# Patient Record
Sex: MALE | Race: WHITE
[De-identification: names, ages, dates, MRNs, and addresses within clinical notes are randomized per-mention and may not be internally consistent; named-entity substitution may affect disease eponyms.]

---

## 2018-05-17 ENCOUNTER — HOSPITAL ENCOUNTER (EMERGENCY)
Dept: HOSPITAL 62 - ER | Age: 23
Discharge: HOME | End: 2018-05-17
Payer: SELF-PAY

## 2018-05-17 VITALS — SYSTOLIC BLOOD PRESSURE: 112 MMHG | DIASTOLIC BLOOD PRESSURE: 73 MMHG

## 2018-05-17 VITALS — SYSTOLIC BLOOD PRESSURE: 122 MMHG | DIASTOLIC BLOOD PRESSURE: 78 MMHG

## 2018-05-17 DIAGNOSIS — E05.90: ICD-10-CM

## 2018-05-17 DIAGNOSIS — Z79.899: ICD-10-CM

## 2018-05-17 DIAGNOSIS — R00.2: Primary | ICD-10-CM

## 2018-05-17 DIAGNOSIS — R00.2: ICD-10-CM

## 2018-05-17 DIAGNOSIS — R07.9: ICD-10-CM

## 2018-05-17 DIAGNOSIS — R53.1: ICD-10-CM

## 2018-05-17 DIAGNOSIS — J45.909: Primary | ICD-10-CM

## 2018-05-17 DIAGNOSIS — J45.909: ICD-10-CM

## 2018-05-17 DIAGNOSIS — I45.10: ICD-10-CM

## 2018-05-17 DIAGNOSIS — R06.02: ICD-10-CM

## 2018-05-17 DIAGNOSIS — R07.89: ICD-10-CM

## 2018-05-17 DIAGNOSIS — R00.1: ICD-10-CM

## 2018-05-17 LAB
FREE T4 (FREE THYROXINE): 1.1 NG/DL (ref 0.78–2.19)
T3FREE SERPL-MCNC: 5.9 PG/ML (ref 2.77–5.27)
TSH SERPL-ACNC: 7.55 UIU/ML (ref 0.47–4.68)

## 2018-05-17 PROCEDURE — 93005 ELECTROCARDIOGRAM TRACING: CPT

## 2018-05-17 PROCEDURE — 84443 ASSAY THYROID STIM HORMONE: CPT

## 2018-05-17 PROCEDURE — 36415 COLL VENOUS BLD VENIPUNCTURE: CPT

## 2018-05-17 PROCEDURE — 99285 EMERGENCY DEPT VISIT HI MDM: CPT

## 2018-05-17 PROCEDURE — 99284 EMERGENCY DEPT VISIT MOD MDM: CPT

## 2018-05-17 PROCEDURE — 93010 ELECTROCARDIOGRAM REPORT: CPT

## 2018-05-17 PROCEDURE — 71045 X-RAY EXAM CHEST 1 VIEW: CPT

## 2018-05-17 PROCEDURE — 71046 X-RAY EXAM CHEST 2 VIEWS: CPT

## 2018-05-17 PROCEDURE — 84439 ASSAY OF FREE THYROXINE: CPT

## 2018-05-17 PROCEDURE — 84481 FREE ASSAY (FT-3): CPT

## 2018-05-17 NOTE — ER DOCUMENT REPORT
ED General





- General


Chief Complaint: Palpitations


Stated Complaint: CHEST PAIN, PALPITATIONS


Time Seen by Provider: 05/17/18 21:52


Notes: 


Patient is a 22-year-old male who presents with complaint of feeling 

palpitations.  When asked him to explain palpitations he says he just feels as 

if his heart beats hard.  He says it does not initially be fast but it beats 

heart sometimes hurts when the beats.  I saw him yesterday for similar 

symptoms.  Patient has a history of taking hormone supplementation.  He was 

seen a month ago after this he was told to stop doing that and also his thyroid 

function was off and therefore he was referred to primary care doctor placed on 

atenolol.  Patient's heart rate last night was in the 40s most the time he was 

on a monitor and therefore I told him he has a state taking the atenolol.  

Patient says he feels that his palpitations have increased even though his 

heart rate is completely normal here.  When talking to mom watch and monitor 

and I do not see any evidence in his heart rate remains in the 50s and 60s a 

whole time in the room.





TRAVEL OUTSIDE OF THE [place] IN LAST 30 DAYS: No





- Related Data


Allergies/Adverse Reactions: 


 





No Known Allergies Allergy (Verified 01/02/18 14:12)


 











Past Medical History





- Social History


Smoking Status: Never Smoker


Frequency of alcohol use: None


Drug Abuse: None


Family History: Reviewed & Not Pertinent


Pulmonary Medical History: Reports: Hx Asthma


Renal/ Medical History: Denies: Hx Peritoneal Dialysis





- Immunizations


Immunizations up to date: Yes


Hx Diphtheria, Pertussis, Tetanus Vaccination: Yes





Review of Systems





- Review of Systems


Notes: 





My Normal Review Basic





REVIEW OF SYSTEMS:


CONSTITUTIONAL :  Denies fever,  chills, or sweats.  Denies recent illness.


EENT:   Denies eye, ear, throat, or mouth pain or symptoms.  Denies nasal or 

sinus congestion.


CARDIOVASCULAR: Occasional pain in chest when he feels a "hard heartbeat".


RESPIRATORY:  Denies cough, cold, or chest congestion.  Denies shortness of 

breath, difficulty breathing, or wheezing.


GASTROINTESTINAL:  Denies abdominal pain.  Denies nausea, vomiting, or 

diarrhea.  Denies constipation.  Last BM: 


GENITOURINARY:  Denies difficulty urinating, painful urination, burning, 

frequency, or blood in urine.


MUSCULOSKELETAL:  Denies neck or back pain or joint pain or swelling.


SKIN:   Denies rash or skin lesions.


NEUROLOGICAL:  Denies altered mental status or loss of consciousness.  Denies 

headache.  Denies weakness or paralysis or loss of use of either side.  Denies 

problems with gait or speech.  Denies sensory or motor loss.


ALL OTHER SYSTEMS REVIEWED AND NEGATIVE.





Physical Exam





- Vital signs


Vitals: 


 











Temp Pulse Resp BP Pulse Ox


 


 97.6 F   61   18   136/72 H  100 


 


 05/17/18 21:44  05/17/18 21:44  05/17/18 21:44  05/17/18 21:44  05/17/18 21:44














- Notes


Notes: 





General Appearance: Well nourished, alert, cooperative, no acute distress, no 

obvious discomfort.  Well-appearing.


Vitals: reviewed, See vital signs table.


Head: no swelling or tenderness to the head


Eyes: PERRL, EOMI, Conjuctiva clear


Mouth: No decreasd moistur


Neck: Supple, no neck tenderness, No thyromegaly


Lungs: No wheezing, No rales, No rhonci, No accessory muscle use, good air 

exchange bilaterally.


Heart: Normal rate, Regular rythm, No murmur, no rub


Extremities: strength 5/5 in all extremities, good pulses in all extremities, 

no swelling or tenderness in the extremities, no edema.


Skin: warm, dry, appropriate color, no rash


Neuro: speech clear, oriented x 3, normal affect, responds appropriately to 

questions.





Course





- Re-evaluation


Re-evalutation: 





05/18/18 05:59


She looks very well on exam.  I do not see evidence of PACs or PVCs while 

talked to the patient watch and monitor.  He has normal-appearing EKG except 

for right bundle branch block which is old in comparison to his previous EKGs.  

Chest x-ray is ordered in triage and continues to be normal.  I will refer him 

to cardiology for follow-up and possible Holter monitor placement.  Patient has 

no risk factors for coronary disease.  Otherwise looks well.  Patient was again 

asked about whether or not he should take an estrogen supplements to help with 

his thyroid.  I informed him that last night when he is here his thyroid 

function numbers were improving significantly and that he should therefore stay 

away from all forms of supplement or amount that are not prescribed.  Patient 

agrees to do this.  Patient return to ER if she he has severe chest pain, 

difficulty breathing, rapid heartbeat, or if he feels unwell.  Patient agrees 

with plan will be discharged home.





Dictation of this chart was performed using voice recognition software; 

therefore, there may be some unintended grammatical errors.





- Vital Signs


Vital signs: 


 











Temp Pulse Resp BP Pulse Ox


 


 97.8 F   85   18   122/78   100 


 


 05/17/18 23:52  05/17/18 23:52  05/17/18 23:52  05/17/18 23:52  05/17/18 23:52














- EKG Interpretation by Me


Additional EKG results interpreted by me: 





05/17/18 23:39


EKG is reviewed and interpreted by me.  EKG shows sinus rhythm with rate 57 

bpm.  No ST segment elevation or depression.  No ischemic T-wave inversions.  

IN interval and QTc intervals are within normal range.  QRS duration is 

slightly prolonged.  Patient does have a incomplete right bundle branch block 

which is unchanged comparison to his previous EKG.





Discharge





- Discharge


Clinical Impression: 


 Heart palpitations





Condition: Good


Disposition: HOME, SELF-CARE


Additional Instructions: 


Please do not take the Atenolol as your heart rate is too ow too take it. 

Please call Dr. Taylor's office in the am and inform them that the ER is 

referring you there for evaluation and potential holter monitor due to your 

recurrent palpitations. Please do not take any form of hormone supplementation. 

Your Thyroid function was much improved last night compared to last month. 

Despite this, you still need to follow up with your doctor early next week and 

have it rechecked to make sure it is continuing to improve.


Referrals: 


TRAVIS TAYLOR MD [ACTIVE STAFF] - 05/18/18

## 2018-05-17 NOTE — RADIOLOGY REPORT (SQ)
EXAM DESCRIPTION: Single view of the chest



CLINICAL HISTORY: dyspnea



COMPARISON: 3/10/2018



FINDINGS: Single frontal view of the chest.  The

cardiomediastinal silhouette has normal size and contour. No

consolidation, pneumothorax, or pleural effusion.  No displaced

rib fractures identified.  Upper abdominal soft tissues are

unremarkable.



IMPRESSION:



1. No acute pulmonary process identified.

## 2018-05-17 NOTE — ER DOCUMENT REPORT
ED Medical Screen (RME)





- General


Chief Complaint: Palpitations


Stated Complaint: CHEST PAIN, PALPITATIONS


Time Seen by Provider: 05/17/18 21:52


TRAVEL OUTSIDE OF THE [place] IN LAST 30 DAYS: No





- HPI


Notes: 





05/17/18 21:52


Patient is a 22-year-old male with a history of anxiety and palpitations who 

presents to the ED complaining of left sternal chest pain and episodes of 

palpitations over the last couple hours.  Patient states that he used to be on 

atenolol, but has since stopped that.  Patient has been seen before in the past 

for palpitations.  He is still eating and drinking without difficulties.  He is 

urinating normally.  He does not smoke, drink, or do drugs.  He has not been 

seen by cardiologist.  Denies any headache, fever, neck pain, URI, sore throat, 

syncope, cough, shortness of breath, wheeze, dyspnea, abdominal pain, nausea/

vomiting/diarrhea, urinary retention, dysuria, hematuria, or rash.








I have treated and performed a rapid initial assessment of this patient.  A 

comprehensive ED assessment and evaluation of the patient, analysis of test 

results and completion of medical decision making process will be conducted by 

additional ED providers.








PHYSICAL EXAMINATION:





GENERAL: Well-appearing, well-nourished and in no acute distress.  A&Ox4.  

Answers questions appropriately.





Chest: + tenderness to the left sternal border, correlates with pain described.





LUNGS: Breath sounds clear to auscultation bilaterally and equal.  No wheezes 

rales or rhonchi.





HEART: Regular rate and rhythm without murmurs, rubs, gallops.





Extremities:  No cyanosis, clubbing, or edema b/l.  





NEUROLOGICAL: Normal speech, normal gait. 





PSYCH: Normal mood, normal affect.








- Related Data


Allergies/Adverse Reactions: 


 





No Known Allergies Allergy (Verified 01/02/18 14:12)


 











Past Medical History


Pulmonary Medical History: Reports: Hx Asthma


Renal/ Medical History: Denies: Hx Peritoneal Dialysis





- Immunizations


Immunizations up to date: Yes


Hx Diphtheria, Pertussis, Tetanus Vaccination: Yes





Physical Exam





- Vital signs


Vitals: 





 











Temp Pulse Resp BP Pulse Ox


 


 97.6 F   61   18   136/72 H  100 


 


 05/17/18 21:44  05/17/18 21:44  05/17/18 21:44  05/17/18 21:44  05/17/18 21:44














Course





- Vital Signs


Vital signs: 





 











Temp Pulse Resp BP Pulse Ox


 


 97.6 F   61   18   136/72 H  100 


 


 05/17/18 21:44  05/17/18 21:44  05/17/18 21:44  05/17/18 21:44  05/17/18 21:44

## 2018-05-17 NOTE — EKG REPORT
SEVERITY:- ABNORMAL ECG -

SINUS RHYTHM

IVCD, CONSIDER ATYPICAL RBBB

INFERIOR Q WAVES, PROBABLY NORMAL VARIATION

:

Confirmed by: Prabhakar Foley 17-May-2018 09:56:56

## 2018-05-17 NOTE — RADIOLOGY REPORT (SQ)
EXAM DESCRIPTION:  CHEST 2 VIEWS



COMPLETED DATE/TIME:  5/17/2018 10:30 pm



REASON FOR STUDY:  chest pain



COMPARISON:  1/24/2018



EXAM PARAMETERS:  NUMBER OF VIEWS: two views

TECHNIQUE: Digital Frontal and Lateral radiographic views of the chest acquired.

RADIATION DOSE: NA

LIMITATIONS: none



FINDINGS:  LUNGS AND PLEURA: No opacities, masses or pneumothorax. No pleural effusion.

MEDIASTINUM AND HILAR STRUCTURES: No masses or contour abnormalities.

HEART AND VASCULAR STRUCTURES: Heart normal size.  No evidence for failure.

BONES: No acute findings.

HARDWARE: None in the chest.

OTHER: No other significant finding.



IMPRESSION:  NO ACUTE RADIOGRAPHIC FINDING IN THE CHEST.



TECHNICAL DOCUMENTATION:  JOB ID:  [id number]

 2011 [employer]- All Rights Reserved



Reading location - IP/workstation name: [email or web address]

## 2018-05-17 NOTE — ER DOCUMENT REPORT
ED General





- General


Chief Complaint: Chest Pain


Stated Complaint: CHEST PAIN


Time Seen by Provider: 05/17/18 00:16


Notes: 


Patient is a 22-year-old male who presents with complaint of some chest pain 

shortness of breath.  He says he feels more as if he just cannot get air in.  

He was seen here back in March for palpitations.  At that time he was taking a 

"hormone booster" to help with working out and putting on muscle.  He was 

having palpitations and feeling well at that time.  Is also noted that his TSH 

was a little bit low and is T3 was low bit elevated.  Followed up with his 

primary care doctor was placed on Atenolol.  He says since starting Atenolol he 

has felt a little bit more week and has felt some shortness of breath.  He is 

wondering if the Atenolol dose is too high.  He is unsure with his repeat 

thyroid studies have shown.  Denies any fevers.  No infections.  Denies any 

recent leg pain or leg swelling.  No other complaints this time.  He has a 

previous history of asthma.  He does not think he has been wheezing.





TRAVEL OUTSIDE OF THE [place] IN LAST 30 DAYS: No





- Related Data


Allergies/Adverse Reactions: 


 





No Known Allergies Allergy (Verified 01/02/18 14:12)


 











Past Medical History





- Social History


Smoking Status: Never Smoker


Frequency of alcohol use: None


Drug Abuse: None


Family History: Reviewed & Not Pertinent


Pulmonary Medical History: Reports: Hx Asthma


Renal/ Medical History: Denies: Hx Peritoneal Dialysis





- Immunizations


Immunizations up to date: Yes


Hx Diphtheria, Pertussis, Tetanus Vaccination: Yes





Review of Systems





- Review of Systems


Notes: 





My Normal Review Basic





REVIEW OF SYSTEMS:


CONSTITUTIONAL :  Denies fever,  chills, or sweats.  Denies recent illness.


EENT:   Denies eye, ear, throat, or mouth pain or symptoms.  Denies nasal or 

sinus congestion.


CARDIOVASCULAR: No real chest pain.  He has more tightness in the chest.


RESPIRATORY: Feels as if he cannot get air in when taking a deep breath.


GASTROINTESTINAL:  Denies abdominal pain.  Denies nausea, vomiting, or 

diarrhea.  Denies constipation.  Last BM: 


MUSCULOSKELETAL:  Denies neck or back pain or joint pain or swelling.


SKIN:   Denies rash or skin lesions.


NEUROLOGICAL:  Denies altered mental status or loss of consciousness.  Denies 

headache.  Denies weakness or paralysis or loss of use of either side.  Denies 

problems with gait or speech.  Denies sensory or motor loss.


ALL OTHER SYSTEMS REVIEWED AND NEGATIVE.





Physical Exam





- Vital signs


Vitals: 


 











Temp Pulse Resp BP Pulse Ox


 


 98.5 F   56 L  18   134/77 H  99 


 


 05/17/18 00:13  05/17/18 00:13  05/17/18 00:13  05/17/18 00:13  05/17/18 00:13














- Notes


Notes: 





General Appearance: Well nourished, alert, cooperative, no acute distress, no 

obvious discomfort.  Well-appearing.


Vitals: reviewed, See vital signs table.


Head: no swelling or tenderness to the head


Eyes: PERRL, EOMI, Conjuctiva clear


Mouth: No decreasd moisture


Lungs: No wheezing, No rales, No rhonci, No accessory muscle use, good air 

exchange bilaterally.


Heart: Normal rate, Regular rythm, No murmur, no rub


Abdomen: Normal BS, soft, No rigidity, No abdominal tenderness, No guarding, no 

rebound, no abdominal masses, no organomegaly


Extremities: strength 5/5 in all extremities, good pulses in all extremities, 

no swelling or tenderness in the extremities, no edema.


Skin: warm, dry, appropriate color, no rash


Neuro: speech clear, oriented x 3, normal affect, responds appropriately to 

questions.





Course





- Re-evaluation


Re-evalutation: 





05/17/18 02:56


Patient was placed on atenolol by the primary care doctor because palpitations 

and hyperthyroidism.  Since she states she is actually now will be elevated.  

His T3 is decreasing.  I wonder if it may be the hormone supplementation that 

he was taking may have had some effect on his thyroid production.  Patient is 

already bradycardic with heart rate in the upper 40s and low 50s.  Therefore 

should stop atenolol.  Atenolol could also be affecting his breathing and 

making his heart rate decreased.  I talked about this and is agreeable to this.

  I strongly encouraged him follow-up with his doctor next 1-2 days for 

reevaluation and to recheck his thyroid levels.  I strongly encouraged her 

return to ER if he has recurrent difficulty breathing, heart racing, 

palpitations, or if he feels unwell.  Patient agrees with plan will be 

discharged home.





Dictation of this chart was performed using voice recognition software; 

therefore, there may be some unintended grammatical errors.





- Vital Signs


Vital signs: 


 











Temp Pulse Resp BP Pulse Ox


 


 98.5 F   56 L  16   112/73   99 


 


 05/17/18 00:13  05/17/18 00:13  05/17/18 02:31  05/17/18 02:31  05/17/18 02:31














- Laboratory


Laboratory results interpreted by me: 


 











  05/17/18





  01:17


 


TSH  7.55 H


 


Free T3 pg/mL  5.90 H














- EKG Interpretation by Me


Additional EKG results interpreted by me: 





05/17/18 00:27


EKG is reviewed and interpreted by me.  EKG shows sinus rhythm with a rate of 

61 bpm.  No ST segment elevation or depression.  No ischemic T-wave inversions.

  RI interval and QT intervals are within normal range.  QRS duration slightly 

prolonged.  Patient does have appears to be an incomplete or atypical right 

bundle branch block.  These findings are consistent with his previous EKG from 

March 10, 2018.





Discharge





- Discharge


Clinical Impression: 


Dyspnea


Qualifiers:


 Dyspnea type: unspecified Qualified Code(s): R06.00 - Dyspnea, unspecified





Condition: Good


Disposition: HOME, SELF-CARE


Additional Instructions: 


Please stop taking the Atenolol. Please call your doctor in the am to make a 

close follow up appointment on monday. Please return to the ER if you feel that 

you heart is racing, have recurrent difficulty breathing, or feel that you are 

worsening n any way.


Forms:  Return to Work

## 2018-05-18 ENCOUNTER — HOSPITAL ENCOUNTER (EMERGENCY)
Dept: HOSPITAL 62 - ER | Age: 23
Discharge: HOME | End: 2018-05-18
Payer: SELF-PAY

## 2018-05-18 VITALS — SYSTOLIC BLOOD PRESSURE: 113 MMHG | DIASTOLIC BLOOD PRESSURE: 69 MMHG

## 2018-05-18 DIAGNOSIS — R00.2: Primary | ICD-10-CM

## 2018-05-18 LAB
ADD MANUAL DIFF: NO
ALBUMIN SERPL-MCNC: 4.7 G/DL (ref 3.5–5)
ALP SERPL-CCNC: 85 U/L (ref 38–126)
ALT SERPL-CCNC: 46 U/L (ref 21–72)
ANION GAP SERPL CALC-SCNC: 11 MMOL/L (ref 5–19)
APPEARANCE UR: CLEAR
APTT PPP: YELLOW S
AST SERPL-CCNC: 33 U/L (ref 17–59)
BARBITURATES UR QL SCN: NEGATIVE
BASOPHILS # BLD AUTO: 0 10^3/UL (ref 0–0.2)
BASOPHILS NFR BLD AUTO: 0.4 % (ref 0–2)
BILIRUB DIRECT SERPL-MCNC: 0.3 MG/DL (ref 0–0.4)
BILIRUB SERPL-MCNC: 0.6 MG/DL (ref 0.2–1.3)
BILIRUB UR QL STRIP: NEGATIVE
BUN SERPL-MCNC: 14 MG/DL (ref 7–20)
CALCIUM: 9.8 MG/DL (ref 8.4–10.2)
CHLORIDE SERPL-SCNC: 105 MMOL/L (ref 98–107)
CO2 SERPL-SCNC: 29 MMOL/L (ref 22–30)
EOSINOPHIL # BLD AUTO: 0.1 10^3/UL (ref 0–0.6)
EOSINOPHIL NFR BLD AUTO: 0.8 % (ref 0–6)
ERYTHROCYTE [DISTWIDTH] IN BLOOD BY AUTOMATED COUNT: 13 % (ref 11.5–14)
GLUCOSE SERPL-MCNC: 106 MG/DL (ref 75–110)
GLUCOSE UR STRIP-MCNC: NEGATIVE MG/DL
HCT VFR BLD CALC: 43.5 % (ref 37.9–51)
HGB BLD-MCNC: 15.1 G/DL (ref 13.5–17)
KETONES UR STRIP-MCNC: NEGATIVE MG/DL
LYMPHOCYTES # BLD AUTO: 1.9 10^3/UL (ref 0.5–4.7)
LYMPHOCYTES NFR BLD AUTO: 14.4 % (ref 13–45)
MCH RBC QN AUTO: 30.3 PG (ref 27–33.4)
MCHC RBC AUTO-ENTMCNC: 34.6 G/DL (ref 32–36)
MCV RBC AUTO: 88 FL (ref 80–97)
METHADONE UR QL SCN: NEGATIVE
MONOCYTES # BLD AUTO: 1 10^3/UL (ref 0.1–1.4)
MONOCYTES NFR BLD AUTO: 7.5 % (ref 3–13)
NEUTROPHILS # BLD AUTO: 10.2 10^3/UL (ref 1.7–8.2)
NEUTS SEG NFR BLD AUTO: 76.9 % (ref 42–78)
NITRITE UR QL STRIP: NEGATIVE
PCP UR QL SCN: NEGATIVE
PH UR STRIP: 7 [PH] (ref 5–9)
PLATELET # BLD: 280 10^3/UL (ref 150–450)
POTASSIUM SERPL-SCNC: 3.9 MMOL/L (ref 3.6–5)
PROT SERPL-MCNC: 7.9 G/DL (ref 6.3–8.2)
PROT UR STRIP-MCNC: NEGATIVE MG/DL
RBC # BLD AUTO: 4.97 10^6/UL (ref 4.35–5.55)
SODIUM SERPL-SCNC: 145.4 MMOL/L (ref 137–145)
SP GR UR STRIP: 1.01
TOTAL CELLS COUNTED % (AUTO): 100 %
URINE AMPHETAMINES SCREEN: NEGATIVE
URINE BENZODIAZEPINES SCREEN: NEGATIVE
URINE COCAINE SCREEN: NEGATIVE
URINE MARIJUANA (THC) SCREEN: NEGATIVE
UROBILINOGEN UR-MCNC: NEGATIVE MG/DL (ref ?–2)
WBC # BLD AUTO: 13.3 10^3/UL (ref 4–10.5)

## 2018-05-18 PROCEDURE — 36415 COLL VENOUS BLD VENIPUNCTURE: CPT

## 2018-05-18 PROCEDURE — 93005 ELECTROCARDIOGRAM TRACING: CPT

## 2018-05-18 PROCEDURE — 81001 URINALYSIS AUTO W/SCOPE: CPT

## 2018-05-18 PROCEDURE — 83735 ASSAY OF MAGNESIUM: CPT

## 2018-05-18 PROCEDURE — 80307 DRUG TEST PRSMV CHEM ANLYZR: CPT

## 2018-05-18 PROCEDURE — 93010 ELECTROCARDIOGRAM REPORT: CPT

## 2018-05-18 PROCEDURE — 80053 COMPREHEN METABOLIC PANEL: CPT

## 2018-05-18 PROCEDURE — 85025 COMPLETE CBC W/AUTO DIFF WBC: CPT

## 2018-05-18 PROCEDURE — 99285 EMERGENCY DEPT VISIT HI MDM: CPT

## 2018-05-18 NOTE — ER DOCUMENT REPORT
ED General





- General


Chief Complaint: Palpitations


Stated Complaint: RAPID HEART RATE


Time Seen by Provider: 05/18/18 02:54


TRAVEL OUTSIDE OF THE [place] IN LAST 30 DAYS: No





- HPI


Notes: 





Patient is a 22-year-old male with a history of palpitations who presents to 

the ED for the second time tonight complaining of continued palpitations.  He 

was evaluated by Dr. Lindo earlier and was advised to stop his atenolol and 

to follow-up with cardiology tomorrow for possible Holter monitor.  Patient was 

evaluated in March for palpitations as well he was taking a hormone booster at 

that time and was found to have hyperthyroidism because of it.  Patient states 

that he is still eating and drinking without difficulties.  He is urinating 

normally and having normal bowel moods.  He has no other concerns or complaints 

at this time.  Denies any headache, fever, neck pain, URI, sore throat, chest 

pain, syncope, cough, shortness of breath, wheeze, dyspnea, abdominal pain, 

nausea/vomiting/diarrhea, urinary retention, dysuria, hematuria, loss of 

control of bowel or bladder, numbness/tingling, saddle anesthesia, muscle 

paralysis/weakness, or rash.

















- Related Data


Allergies/Adverse Reactions: 


 





No Known Allergies Allergy (Verified 01/02/18 14:12)


 











Past Medical History





- Social History


Smoking Status: Unknown if Ever Smoked


Chew tobacco use (# tins/day): No


Frequency of alcohol use: None


Drug Abuse: None


Family History: Reviewed & Not Pertinent


Patient has suicidal ideation: No


Patient has homicidal ideation: No


Pulmonary Medical History: Reports: Hx Asthma


Renal/ Medical History: Denies: Hx Peritoneal Dialysis





- Immunizations


Immunizations up to date: Yes


Hx Diphtheria, Pertussis, Tetanus Vaccination: Yes





Review of Systems





- Review of Systems


-: Yes All other systems reviewed and negative





Physical Exam





- Vital signs


Vitals: 


 











Temp Pulse Resp BP Pulse Ox


 


 97.6 F   61   16   139/85 H  100 


 


 05/18/18 01:37  05/18/18 01:37  05/18/18 01:37  05/18/18 01:37  05/18/18 01:37














- Notes


Notes: 





PHYSICAL EXAMINATION:





GENERAL: Well-appearing, well-nourished and in no acute distress.





HEAD: Atraumatic, normocephalic.





EYES: Pupils equal round and reactive to light, extraocular movements intact, 

sclera anicteric, conjunctiva are normal.





ENT: Nares patent and without discharge.  oropharynx clear without exudates.  

No tonsilar hypertrophy or erythema.  Moist mucous membranes.  





NECK: Normal range of motion, supple without lymphadenopathy





LUNGS: Breath sounds clear to auscultation bilaterally and equal.  No wheezes 

rales or rhonchi.





HEART: Regular rate and rhythm without murmurs, rubs, gallops.





ABDOMEN: Soft, nontender, nondistended abdomen.  No guarding, no rebound.  No 

masses appreciated.  Normal bowel sounds present.  No CVA tenderness 

bilaterally.





Musculoskeletal: FROM to passive/active. Strength 5+/5. 





Extremities:  No cyanosis, clubbing, or edema b/l.  Peripheral pulses 2+.  

Capillary refill less than 3 seconds.





NEUROLOGICAL: Cranial nerves grossly intact.  Normal speech, normal gait.  

Normal sensory, motor exams 





PSYCH: Normal mood, normal affect.





SKIN: Warm, Dry, normal turgor, no rashes or lesions noted.





Course





- Re-evaluation


Re-evalutation: 





05/18/18 04:25


Patient is an afebrile, well-hydrated, 22-year-old male who presents to the ED 

with palpitations, unspecified.  Vitals are acceptable.  PE is otherwise 

unremarkable.  He did not have any irregular rhythm or tachycardia during his 

stay in the emergency department again for this visit.  He has no tachypnea or 

hypoxia.  He is nontoxic-appearing.  He is sleeping comfortably during half of 

his stay.  He is tolerating p.o. without any difficulties.  CBC, CMP, urinalysis

, urine drug screen were unremarkable for any acute pathology.  Patient had a 

thyroid panel performed just prior to this visit.  Patient is to refrain from 

his atenolol as previously instructed.  Patient to call Dr. Taylor in the 

morning for further evaluation and management.  Recheck with your PCM in 3-5 

days.  Return to the ED with any worsening/concerning symptoms otherwise as 

reviewed discharge.  Patient is in agreement.  Case reviewed with Dr. Lindo 

who is also in agreement with dispo/plan.








- Vital Signs


Vital signs: 


 











Temp Pulse Resp BP Pulse Ox


 


 97.6 F   61   12   113/69   100 


 


 05/18/18 01:37  05/18/18 01:37  05/18/18 04:01  05/18/18 04:01  05/18/18 04:01














- Laboratory


Result Diagrams: 


 05/18/18 03:23





 05/18/18 03:23


Laboratory results interpreted by me: 


 











  05/18/18 05/18/18 05/18/18





  03:15 03:23 03:23


 


WBC   13.3 H 


 


Absolute Neutrophils   10.2 H 


 


Sodium    145.4 H


 


Urine Ascorbic Acid  20 H  














Discharge





- Discharge


Clinical Impression: 


 Palpitations





Condition: Stable


Disposition: HOME, SELF-CARE


Additional Instructions: 


Maintain adequate fluid and food intake


Take home medications as directed


healthy diet


Exercise regularly


Weight control


Monitor blood pressure daily and keep a log


Monitor symptoms for any acute changes


Recheck with your PCM in 3-5 days


Call the cardiologist tomorrow to schedule an appointment for further 

evaluation and management


Return to the ED with any worsening symptoms and/or development of fever, 

headache, chest pain, palpitations, syncope, shortness of breath, trouble 

breathing, abdominal pain, n/v/d, blood in stool/urine, loss of control of bowel

/bladder, urinary retention, muscle weakness/paralysis, numbness/tingling, or 

other worsening symptoms that are concerning to you.


Referrals: 


TRAVIS TAYLOR MD [ACTIVE STAFF] - Follow up in 3-5 days (call tomorrow 

to schedule an appointment)

## 2018-05-18 NOTE — EKG REPORT
SEVERITY:- ABNORMAL ECG -

SINUS RHYTHM

NONSPECIFIC INTRAVENTRICULAR CONDUCTION DELAY

:

Confirmed by: Prabhakar Foley 18-May-2018 05:17:26

## 2018-05-19 ENCOUNTER — HOSPITAL ENCOUNTER (EMERGENCY)
Dept: HOSPITAL 62 - ER | Age: 23
LOS: 1 days | Discharge: HOME | End: 2018-05-20
Payer: SELF-PAY

## 2018-05-19 DIAGNOSIS — J45.909: ICD-10-CM

## 2018-05-19 DIAGNOSIS — R00.1: ICD-10-CM

## 2018-05-19 DIAGNOSIS — F41.9: ICD-10-CM

## 2018-05-19 DIAGNOSIS — R07.89: Primary | ICD-10-CM

## 2018-05-19 DIAGNOSIS — Z79.899: ICD-10-CM

## 2018-05-19 DIAGNOSIS — R00.2: ICD-10-CM

## 2018-05-19 PROCEDURE — 99284 EMERGENCY DEPT VISIT MOD MDM: CPT

## 2018-05-19 NOTE — ER DOCUMENT REPORT
ED General





- General


Chief Complaint: Chest Tightness


Stated Complaint: CHEST TIGHTNESS


Time Seen by Provider: 05/19/18 23:12


Notes: 





Patient is a 22-year-old male well-known to this emergency department who 

presents with complaints of chest tightness and palpitations past several 

hours.  Patient has had multiple visits to the emergency department with the 

same symptoms with reassuring workups under all of those visits.  He does admit 

to continuing to use pre-workouts but is uncertain whether or not this is 

contributing to his symptoms.  Nothing seems to improve his symptoms.  He 

describes the sensation as being a heaviness or pressure in the left side of 

his chest with associated feelings of palpitations but states when he checks 

his heart rate is consistently low.  He has been continue to take atenolol that 

have been prescribed by his primary care doctor but does not feel that this is 

helping his symptoms.  He does admit to some anxiety and feels like this may be 

contributing to his symptoms.  He denies any shortness of breath, syncope, 

fever or constitutional symptoms.


TRAVEL OUTSIDE OF THE [place] IN LAST 30 DAYS: No





- Related Data


Allergies/Adverse Reactions: 


 





No Known Allergies Allergy (Verified 01/02/18 14:12)


 











Past Medical History





- General


Information source: Patient





- Social History


Smoking Status: Never Smoker


Frequency of alcohol use: None


Drug Abuse: None


Lives with: Family


Family History: Reviewed & Not Pertinent


Pulmonary Medical History: Reports: Hx Asthma


Renal/ Medical History: Denies: Hx Peritoneal Dialysis





- Immunizations


Immunizations up to date: Yes


Hx Diphtheria, Pertussis, Tetanus Vaccination: Yes





Review of Systems





- Review of Systems


Notes: 





Constitutional: Negative for fever.


HENT: Negative for sore throat.


Eyes: Negative for visual changes.


Cardiovascular: Positive for chest pain.


Respiratory: Negative for shortness of breath.


Gastrointestinal: Negative for abdominal pain, vomiting or diarrhea.


Genitourinary: Negative for dysuria.


Musculoskeletal: Negative for back pain.


Skin: Negative for rash.


Neurological: Negative for headaches, weakness or numbness.





10 point ROS negative except as marked above and in HPI.





Physical Exam





- Vital signs


Vitals: 


 











Temp Pulse Resp BP Pulse Ox


 


 97.9 F   56 L  16   120/71   100 


 


 05/19/18 22:43  05/19/18 22:43  05/19/18 22:43  05/19/18 22:43  05/19/18 22:43











Interpretation: Bradycardic


Notes: 





PHYSICAL EXAMINATION:





GENERAL: Well-appearing, well-nourished and in no acute distress.





HEAD: Atraumatic, normocephalic.





EYES: Pupils equal round and reactive to light, extraocular movements intact, 

sclera anicteric, conjunctiva are normal.





ENT: nares patent, oropharynx clear without exudates.  Moist mucous membranes.





NECK: Normal range of motion, supple without lymphadenopathy





LUNGS: Breath sounds clear to auscultation bilaterally and equal.  No wheezes 

rales or rhonchi.





HEART: Regular bradycardia without murmurs





ABDOMEN: Soft, nontender, normoactive bowel sounds.  No guarding, no rebound.  

No masses appreciated.





EXTREMITIES: Normal range of motion, no pitting or edema.  No cyanosis.





NEUROLOGICAL: No focal neurological deficits. Moves all extremities 

spontaneously and on command.





PSYCH: Mildly anxious





SKIN: Warm, Dry, normal turgor, no rashes or lesions noted.





Course





- Re-evaluation


Re-evalutation: 





05/19/18 23:45


Presentation of a well-appearing male in no distress complaining of 

intermittent palpitations, chest heaviness, and anxiety.  Patient has been seen 

in the emergency department repeatedly for this complaint had repeated workups 

that remained normal.  Patient again admits to continuing to use supplements 

from over-the-counter including pre-workout supplements.  I had an extensive 

conversation with the patient about the dangers of these substances and have 

reviewed with him appropriate weight lifting regimens and approaches that would 

not involve using these supplements which are clearly participating in his 

symptoms.  His EKG is unremarkable.  I do not see any indication for repeat 

blood work today.  The patient has given me his word and shook my hand that he 

will not use these medications ever again and that he will stick to safe 

substances such as creatine, supplemental protein, as well as caffeine pills or 

coffee.  I have instructed him to discontinue atenolol as it is persistently 

making him bradycardic and I do not believe he would need this medication as 

every time he has come in complaining of palpitations he has never once been 

tachycardic or been shown to have premature ventricular contractions on his 

EKGs.  At this time will discharge with return precautions and follow-up 

recommendations.  Verbal discharge instructions given a the bedside and 

opportunity for questions given. Medication warnings reviewed. Patient is in 

agreement with this plan and has verbalized understanding of return precautions 

and the need for primary care follow-up in the next 24-72 hours.











- Vital Signs


Vital signs: 


 











Temp Pulse Resp BP Pulse Ox


 


 98.6 F   53 L  16   124/72   100 


 


 05/20/18 01:09  05/20/18 01:09  05/20/18 01:09  05/20/18 01:09  05/20/18 01:09














- EKG Interpretation by Me


Additional EKG results interpreted by me: 





05/19/18 23:48


Sinus rhythm.  Rate 54.  No ST elevations or depressions.  QTC is 398.





Discharge





- Discharge


Clinical Impression: 


 Palpitations, Chest discomfort





Condition: Good


Disposition: HOME, SELF-CARE


Additional Instructions: 


STOP ALL PREWORK OUTS. YOU DO NOT NEED THESE SUBSTANCES TO HAVE GOOD WORKOUTS 

OR GAIN MUSCLE MASS.





What I take prior to workout:


1.) 200mg of caffeine, pill form


2.)  5 g of creatine


3.)  20-30 g of protein


4.)  Easily digested carbohydrate such as a banana, a glass of chocolate milk, 

or package of crackers





What I take after workout:


1.)  Protein shake


2.)  Plenty of water





You do not need to take anything else beyond this to achieve appropriate 

results in the setting of lifting weights.  The medications that you have been 

taking are certainly contributing to the recurrence of your symptoms of chest 

heaviness and palpitations.  Please also discontinue atenolol as you do not 

need this medication.





Return if you develop chest pain, shortness of breath, pass out, or have any 

other symptoms that are worrisome to you.

## 2018-05-20 VITALS — DIASTOLIC BLOOD PRESSURE: 72 MMHG | SYSTOLIC BLOOD PRESSURE: 124 MMHG

## 2018-05-20 NOTE — EKG REPORT
SEVERITY:- ABNORMAL ECG -

SINUS RHYTHM

IVCD, CONSIDER ATYPICAL RBBB

:

Confirmed by: Prabhakar Foley 20-May-2018 16:13:44

## 2018-05-22 ENCOUNTER — HOSPITAL ENCOUNTER (EMERGENCY)
Dept: HOSPITAL 62 - ER | Age: 23
LOS: 1 days | Discharge: HOME | End: 2018-05-23
Payer: SELF-PAY

## 2018-05-22 DIAGNOSIS — R00.2: ICD-10-CM

## 2018-05-22 DIAGNOSIS — R07.9: Primary | ICD-10-CM

## 2018-05-22 LAB
ADD MANUAL DIFF: NO
BASOPHILS # BLD AUTO: 0 10^3/UL (ref 0–0.2)
BASOPHILS NFR BLD AUTO: 0.6 % (ref 0–2)
EOSINOPHIL # BLD AUTO: 0.1 10^3/UL (ref 0–0.6)
EOSINOPHIL NFR BLD AUTO: 1.5 % (ref 0–6)
ERYTHROCYTE [DISTWIDTH] IN BLOOD BY AUTOMATED COUNT: 13 % (ref 11.5–14)
HCT VFR BLD CALC: 42.6 % (ref 37.9–51)
HGB BLD-MCNC: 14.7 G/DL (ref 13.5–17)
LYMPHOCYTES # BLD AUTO: 2.8 10^3/UL (ref 0.5–4.7)
LYMPHOCYTES NFR BLD AUTO: 33.9 % (ref 13–45)
MCH RBC QN AUTO: 30.5 PG (ref 27–33.4)
MCHC RBC AUTO-ENTMCNC: 34.4 G/DL (ref 32–36)
MCV RBC AUTO: 89 FL (ref 80–97)
MONOCYTES # BLD AUTO: 0.8 10^3/UL (ref 0.1–1.4)
MONOCYTES NFR BLD AUTO: 9.2 % (ref 3–13)
NEUTROPHILS # BLD AUTO: 4.5 10^3/UL (ref 1.7–8.2)
NEUTS SEG NFR BLD AUTO: 54.8 % (ref 42–78)
PLATELET # BLD: 282 10^3/UL (ref 150–450)
RBC # BLD AUTO: 4.81 10^6/UL (ref 4.35–5.55)
TOTAL CELLS COUNTED % (AUTO): 100 %
WBC # BLD AUTO: 8.2 10^3/UL (ref 4–10.5)

## 2018-05-22 PROCEDURE — 36415 COLL VENOUS BLD VENIPUNCTURE: CPT

## 2018-05-22 PROCEDURE — 85025 COMPLETE CBC W/AUTO DIFF WBC: CPT

## 2018-05-22 PROCEDURE — 80048 BASIC METABOLIC PNL TOTAL CA: CPT

## 2018-05-22 PROCEDURE — 93010 ELECTROCARDIOGRAM REPORT: CPT

## 2018-05-22 PROCEDURE — 93005 ELECTROCARDIOGRAM TRACING: CPT

## 2018-05-22 PROCEDURE — 99285 EMERGENCY DEPT VISIT HI MDM: CPT

## 2018-05-22 NOTE — ER DOCUMENT REPORT
ED General





- General


Chief Complaint: Chest Pain


Stated Complaint: CHEST PAIN


Time Seen by Provider: 05/22/18 23:19


Notes: 


Patient is a 22-year-old male that comes to the emergency department for chief 

complaint palpitations.  He states that about 15 side today he will fairly 

regular heartbeat, sometimes he feels a discomfort in his chest, sometimes he 

feels flushed patient is.  He denies dizziness, passing out, shortness of 

breath.  He states that he thinks he might be having a thyroid storm.  He had 

patient with an abnormal thyroid panel recently which showed an elevated T3, he 

was placed on atenolol 50 mg by his primary, he was recommended after being 

seen here multiple times to get off of his atenolol because of his bradycardia, 

he states he saw his primary again and they put him on 25 mg which she is 

currently taking.  He denies taking any supplements currently, he denies 

current caffeine, he denies recreational drugs.  He denies any other medical 

history.





TRAVEL OUTSIDE OF THE [place] IN LAST 30 DAYS: No





- Related Data


Allergies/Adverse Reactions: 


 





lorazepam [From Ativan] Adverse Reaction (Verified 05/22/18 22:29)


 Anxiety











Past Medical History





- General


Information source: Patient





- Social History


Smoking Status: Never Smoker


Frequency of alcohol use: None


Drug Abuse: None


Lives with: Spouse/Significant other


Family History: Reviewed & Not Pertinent


Patient has suicidal ideation: No


Patient has homicidal ideation: No


Pulmonary Medical History: Reports: Hx Asthma


Renal/ Medical History: Denies: Hx Peritoneal Dialysis


Surgical Hx: Negative





- Immunizations


Immunizations up to date: Yes


Hx Diphtheria, Pertussis, Tetanus Vaccination: Yes





Review of Systems





- Review of Systems


Constitutional: No symptoms reported


EENT: No symptoms reported


Cardiovascular: See HPI


Respiratory: No symptoms reported


Gastrointestinal: No symptoms reported


Genitourinary: No symptoms reported


Male Genitourinary: No symptoms reported


Musculoskeletal: No symptoms reported


Skin: No symptoms reported


Hematologic/Lymphatic: No symptoms reported


Neurological/Psychological: No symptoms reported





Physical Exam





- Vital signs


Vitals: 


 











Temp Pulse Resp BP Pulse Ox


 


 98.3 F   55 L  16   130/68 H  100 


 


 05/22/18 22:21  05/22/18 22:21  05/22/18 22:21  05/22/18 22:21  05/22/18 22:21














- Notes


Notes: 


GENERAL: Alert, interacts well. No acute distress.


HEAD: Normocephalic, atraumatic.


EYES: Pupils equal, round, and reactive to light. Extraocular movements intact.


ENT: Oral mucosa moist, tongue midline. 


NECK: Full range of motion. Supple. Trachea midline.


LUNGS: Clear to auscultation bilaterally, no wheezes, rales, or rhonchi. No 

respiratory distress.


HEART: Regular rate and rhythm. No murmur


ABDOMEN: Soft, non-tender. Non-distended. Bowel sounds present in all 4 

quadrants.


EXTREMITIES: Moves all 4 extremities spontaneously. No edema, normal radial and 

dorsalis pedis pulses bilaterally. No cyanosis.


BACK: no cervical, thoracic, lumbar midline tenderness. No saddle anesthesia, 

normal distal neurovascular exam. 


NEUROLOGICAL: Alert and oriented x3. Normal speech. [cranial nerves II through 

XII grossly intact]. 


PSYCH: Normal affect, normal mood.


SKIN: Warm, dry, normal turgor. No rashes or lesions noted.











Course





- Re-evaluation


Re-evalutation: 


EKG shows right bundle branch block, no change from prior, unremarkable QTC and 

FL interval, no PACs or PVCs.  Patient without current symptoms on my 

evaluation.  CBC, chemistry unremarkable.  Patient's recent TSH was actually 

slightly elevated, T3 had basically normalized, this was performed a couple of 

days ago.  No tachycardia, on physical exam patient appears calm and relaxed, 

vital signs show mild bradycardia, lowest heart rate noted on monitoring was 

49.  Discussed with patient, but did recommend that he stop taking the atenolol 

because of his bradycardia, discussed cardiac follow-up for Holter monitor, 

discussed endocrinology follow-up which he already has.  Patient has no other 

questions, states he is ready to leave.





- Vital Signs


Vital signs: 


 











Temp Pulse Resp BP Pulse Ox


 


 98.3 F   55 L  16   123/78   100 


 


 05/22/18 22:21  05/22/18 22:21  05/23/18 01:00  05/23/18 01:00  05/23/18 00:59














- Laboratory


Result Diagrams: 


 05/22/18 23:45





 05/22/18 23:45


Laboratory results interpreted by me: 


 











  05/22/18





  23:45


 


Sodium  147.3 H














Discharge





- Discharge


Clinical Impression: 


 Heart palpitations





Condition: Stable


Disposition: HOME, SELF-CARE


Additional Instructions: 


Your monitoring shows low heart rate (bradycardia), this is probably from the 

atenolol. Recommendation is to stop the atenolol. 


Remaining evaluation and workup is normal. Your most recent thryroid panel 

showed significantly improvement. Followup with the Endocrinologist as planned 

for additional management. Follow up with Dr. Taylor (call the referral to set 

this up), Cardiologist, for evaluation of possible episodes of arrhythmia. 





Return for any concerning symptoms - difficulty breathing, chest pain, passing 

out, or any other concerning symptoms. 





Referrals: 


TRAVIS TAYLOR MD [ACTIVE STAFF] - Follow up in 3-5 days

## 2018-05-23 VITALS — DIASTOLIC BLOOD PRESSURE: 78 MMHG | SYSTOLIC BLOOD PRESSURE: 123 MMHG

## 2018-05-23 LAB
ANION GAP SERPL CALC-SCNC: 12 MMOL/L (ref 5–19)
BUN SERPL-MCNC: 9 MG/DL (ref 7–20)
CALCIUM: 9.8 MG/DL (ref 8.4–10.2)
CHLORIDE SERPL-SCNC: 105 MMOL/L (ref 98–107)
CO2 SERPL-SCNC: 30 MMOL/L (ref 22–30)
GLUCOSE SERPL-MCNC: 93 MG/DL (ref 75–110)
POTASSIUM SERPL-SCNC: 4.3 MMOL/L (ref 3.6–5)
SODIUM SERPL-SCNC: 147.3 MMOL/L (ref 137–145)

## 2018-05-23 NOTE — EKG REPORT
SEVERITY:- ABNORMAL ECG -

SINUS RHYTHM

INCOMPLETE RIGHT BUNDLE BRANCH BLOCK

:

Confirmed by: Aramis Rubin MD 23-May-2018 07:14:17

## 2018-05-25 ENCOUNTER — HOSPITAL ENCOUNTER (EMERGENCY)
Dept: HOSPITAL 62 - ER | Age: 23
Discharge: HOME | End: 2018-05-25
Payer: SELF-PAY

## 2018-05-25 ENCOUNTER — HOSPITAL ENCOUNTER (EMERGENCY)
Dept: HOSPITAL 62 - ER | Age: 23
Discharge: LEFT BEFORE DISCHARGE | End: 2018-05-25
Payer: SELF-PAY

## 2018-05-25 VITALS — SYSTOLIC BLOOD PRESSURE: 136 MMHG | DIASTOLIC BLOOD PRESSURE: 77 MMHG

## 2018-05-25 VITALS — DIASTOLIC BLOOD PRESSURE: 79 MMHG | SYSTOLIC BLOOD PRESSURE: 157 MMHG

## 2018-05-25 DIAGNOSIS — Z53.21: Primary | ICD-10-CM

## 2018-05-25 DIAGNOSIS — R06.02: ICD-10-CM

## 2018-05-25 DIAGNOSIS — R07.9: ICD-10-CM

## 2018-05-25 DIAGNOSIS — R00.2: ICD-10-CM

## 2018-05-25 DIAGNOSIS — F41.0: Primary | ICD-10-CM

## 2018-05-25 PROCEDURE — 71046 X-RAY EXAM CHEST 2 VIEWS: CPT

## 2018-05-25 PROCEDURE — 93005 ELECTROCARDIOGRAM TRACING: CPT

## 2018-05-25 PROCEDURE — 93010 ELECTROCARDIOGRAM REPORT: CPT

## 2018-05-25 PROCEDURE — 99285 EMERGENCY DEPT VISIT HI MDM: CPT

## 2018-05-25 NOTE — EKG REPORT
SEVERITY:- ABNORMAL ECG -

SINUS RHYTHM

INCOMPLETE RIGHT BUNDLE BRANCH BLOCK

:

Confirmed by: Aramis Rubin MD 25-May-2018 07:17:10

## 2018-05-25 NOTE — ER DOCUMENT REPORT
ED General





- General


Chief Complaint: Palpitations


Stated Complaint: CHEST PAIN


Time Seen by Provider: 05/25/18 21:55


Notes: 


Patient is a 22-year-old male comes emergency department for chief complaint of 

an episode earlier where he felt like his heart was racing, he had pain in his 

chest, he felt short of breath, and he thought he was having a heart attack.  

Patient was recently taken off of atenolol which she was taking for 

palpitations and possible hyperthyroidism, he was placed on propanolol instead 

because his heart rate was going too low, he states he did take it earlier 

today.  Patient was referred to cardiology to get a Holter monitor because of 

complaints of palpitations although he has not seen them yet.  He has a pending 

follow-up with endocrinology to recheck his thyroid panel because of a slightly 

abnormal recent thyroid panel which was rechecked and improved.  He denies 

smoking, denies any recreational drugs including cocaine or methamphetamine, he 

states he has not taken any supplements since the original ones that might have 

messed up his thyroid panel as he was told previously.





TRAVEL OUTSIDE OF THE [place] IN LAST 30 DAYS: No





- Related Data


Allergies/Adverse Reactions: 


 





lorazepam [From Ativan] Adverse Reaction (Verified 05/22/18 22:29)


 Anxiety











Past Medical History





- General


Information source: Patient





- Social History


Smoking Status: Never Smoker


Frequency of alcohol use: None


Drug Abuse: None


Lives with: Family


Family History: Reviewed & Not Pertinent


Pulmonary Medical History: Reports: Hx Asthma


Renal/ Medical History: Denies: Hx Peritoneal Dialysis


Surgical Hx: Negative





- Immunizations


Immunizations up to date: Yes


Hx Diphtheria, Pertussis, Tetanus Vaccination: Yes





Review of Systems





- Review of Systems


Constitutional: No symptoms reported


EENT: No symptoms reported


Cardiovascular: See HPI


Respiratory: No symptoms reported


Gastrointestinal: No symptoms reported


Genitourinary: No symptoms reported


Male Genitourinary: No symptoms reported


Musculoskeletal: No symptoms reported


Skin: No symptoms reported


Hematologic/Lymphatic: No symptoms reported


Neurological/Psychological: See HPI





Physical Exam





- Vital signs


Vitals: 


 











Temp Pulse Resp BP Pulse Ox


 


 98.7 F   90   14   157/79 H  100 


 


 05/25/18 21:35  05/25/18 21:35  05/25/18 21:35  05/25/18 21:35  05/25/18 21:35











Interpretation: Normal





- General


General appearance: Appears well, Alert


In distress: None





- HEENT


Head: Normocephalic, Atraumatic


Eyes: Normal


Conjunctiva: Normal


Extraocular movements intact: Yes


Eyelashes: Normal


Pupils: PERRL - slightly dilated


Mouth/Lips: Normal


Mucous membranes: Normal


Pharynx: Normal


Neck: Normal





- Respiratory


Respiratory status: No respiratory distress


Chest status: Nontender.  No: Tender


Breath sounds: Normal.  No: Decreased air movement, Wheezing


Chest palpation: Normal





- Cardiovascular


Rhythm: Regular.  No: Tachycardia


Heart sounds: Normal auscultation, S1 appreciated, S2 appreciated


Murmur: No





- Abdominal


Inspection: Normal


Distension: No distension


Bowel sounds: Normal


Tenderness: Nontender.  No: Tender, Guarding


Organomegaly: No organomegaly





- Back


Back: Normal, Nontender.  No: Tender





- Extremities


General upper extremity: Normal inspection, Nontender, Normal color, Normal ROM

, Normal temperature


General lower extremity: Normal inspection, Nontender, Normal color, Normal ROM

, Normal temperature, Normal weight bearing.  No: Edema, Juvencio's sign





- Neurological


Neuro grossly intact: Yes


Cognition: Normal


Orientation: AAOx4


[place] Coma Scale Eye Opening: Spontaneous


Red Coma Scale Verbal: Oriented


[place] Coma Scale Motor: Obeys Commands


Red Coma Scale Total: 15


Speech: Normal


Motor strength normal: LUE, RUE, LLE, RLE


Sensory: Normal





- Psychological


Associated symptoms: Other - Patient slightly nervous in appearance, 

occasionally has mouth quivers when he speaks, his pupils are slightly dilated, 

he speaks nervously and suddenly laughs frequently.





- Skin


Skin Temperature: Warm


Skin Moisture: Dry


Skin Color: Normal





Course





- Re-evaluation


Re-evalutation: 


Patient has been to the emergency department for similar symptoms like tonight 

7 times now.  He has had blood workup no recent direct now, multiple x-rays, 

multiple EKGs. He has been placed on the monitor for extended periods with no 

noted arrhythmia by myself and other providers here. EKG showing partial right 

bundle block which is unchanged from prior.  Patient denies any family history 

of MI.





Chest x-ray is unremarkable.  Patient denies any recreational drugs. Discussed 

with patient at bedside. Discussed possible causes of chest pain, and I 

discussed how at this time his symptoms are more suggestive of a panic attack 

that MI, PE, pericarditis, dissection or other acute intrathoracic etiology are 

unlikely based on his risk factors, age, symptoms, evaluation, and previous 

evaluations. Patient already has a follow up with cardiology and with 

endocrinology on the first week of March. Encouraged him to follow through with 

these. I also encouraged him that he consider a therapist since he has already 

been given medication for anxiety and because of his symptoms and workup/

evaluation. Patient appeared unhappy with this instruction. I asked if he had 

any more questions. Will d/c with recommendations. 





Patient now is telling me that his mom had a heart attack at 25 years old, I 

asked why he did not inform me of this before and questioned the truth of this 

now after I have seen him several times and was unaware of this. Patient's 

sister came into the room and repeats that their mother had an MI at the age of 

25. Unsure of catheterization or stent status. Sister states that she had an MI 

and she is in her 20s. She denies a cardiac catheterization or stent, states 

she was admitted to a hospital and put on metoprolol. At this point of 

discussion I recommended we cycle a troponin because of the reported family 

history. 





Nurse came and informed me that now the troponin was declined and that patient, 

sister, and friend had decided to leave. 





- Vital Signs


Vital signs: 


 











Temp Pulse Resp BP Pulse Ox


 


 98.7 F   90   14   157/79 H  100 


 


 05/25/18 21:35  05/25/18 21:35  05/25/18 21:35  05/25/18 21:35  05/25/18 21:35














Discharge





- Discharge


Clinical Impression: 


 Panic attack, Anxiety





Chest pain


Qualifiers:


 Chest pain type: unspecified Qualified Code(s): R07.9 - Chest pain, unspecified





Condition: Stable


Disposition: HOME, SELF-CARE


Additional Instructions: 


Your evaluation here tonight does not show any concerning abnormalities.  Your 

examination and symptoms are consistent with a panic attack.


Continue current medications, see instructions listed below, consider primary 

care referral or treatment for anxiety.


Follow up with your Cardiology and Endocrinology appointments for additional 

workup and management. 


Return for any concerning symptoms including passing out, fever, difficulty 

breathing, or any other concerning symptoms.





__________________





Panic Attack





     The cause of panic attacks is unknown.  Symptoms can include chest pain, 

shortness of breath, palpitations, sweats, and a sense of smothering or 

impending doom.  In time, the panic attacks can lead to generalized anxiety and 

phobias.


     Because the symptoms can mimic heart attack, pulmonary embolism, and other 

serious diseases, the physician has evaluated you for these conditions.  There 

is no evidence of a serious problem.


     An acute panic attack usually goes away by itself without treatment.  A 

severe attack can be treated with medicine to calm you. Long-term, 

antidepressant medicines may help prevent attacks. Counselling can also be very 

beneficial in dealing with panic attacks. Panic attacks are less likely if you 

are getting regular exercise, proper diet, and plenty of sleep.


     It's normal for panic attacks to cause many frightening symptoms. However, 

you should call or return if your symptoms change significantly or if you are 

worsening.

## 2018-05-25 NOTE — RADIOLOGY REPORT (SQ)
EXAM DESCRIPTION:  CHEST 2 VIEWS



COMPLETED DATE/TIME:  5/25/2018 10:23 pm



REASON FOR STUDY:  chest pain



COMPARISON:  5/17/2018



EXAM PARAMETERS:  NUMBER OF VIEWS: two views

TECHNIQUE: Digital Frontal and Lateral radiographic views of the chest acquired.

RADIATION DOSE: NA

LIMITATIONS: none



FINDINGS:  LUNGS AND PLEURA: No opacities, masses or pneumothorax. No pleural effusion.

MEDIASTINUM AND HILAR STRUCTURES: No masses or contour abnormalities.

HEART AND VASCULAR STRUCTURES: Heart normal size.  No evidence for failure.

BONES: No acute findings.

HARDWARE: None in the chest.

OTHER: No other significant finding.



IMPRESSION:  NO ACUTE RADIOGRAPHIC FINDING IN THE CHEST.



TECHNICAL DOCUMENTATION:  JOB ID:  [id number]

[id number]

 2011 [employer]- All Rights Reserved



Reading location - IP/workstation name: [email or web address]

## 2018-05-26 NOTE — EKG REPORT
SEVERITY:- ABNORMAL ECG -

SINUS RHYTHM

INCOMPLETE RIGHT BUNDLE BRANCH BLOCK

:

Confirmed by: Aramis Rubin MD 26-May-2018 07:01:57

## 2018-07-26 ENCOUNTER — HOSPITAL ENCOUNTER (EMERGENCY)
Dept: HOSPITAL 62 - ER | Age: 23
Discharge: HOME | End: 2018-07-26
Payer: SELF-PAY

## 2018-07-26 VITALS — DIASTOLIC BLOOD PRESSURE: 83 MMHG | SYSTOLIC BLOOD PRESSURE: 117 MMHG

## 2018-07-26 DIAGNOSIS — F41.9: ICD-10-CM

## 2018-07-26 DIAGNOSIS — E03.9: ICD-10-CM

## 2018-07-26 DIAGNOSIS — R07.9: Primary | ICD-10-CM

## 2018-07-26 PROCEDURE — 93005 ELECTROCARDIOGRAM TRACING: CPT

## 2018-07-26 PROCEDURE — 93010 ELECTROCARDIOGRAM REPORT: CPT

## 2018-07-26 PROCEDURE — 99285 EMERGENCY DEPT VISIT HI MDM: CPT

## 2018-07-26 PROCEDURE — 71046 X-RAY EXAM CHEST 2 VIEWS: CPT

## 2018-07-26 NOTE — RADIOLOGY REPORT (SQ)
EXAM DESCRIPTION: 



XR CHEST 2 VIEWS



COMPLETED DATE/TME:  07/26/2018 05:08



CLINICAL HISTORY: chest pain



COMPARISON: 5/25/2018



FINDINGS: Frontal and lateral views of the chest.  The

cardiomediastinal silhouette has normal size and contour. No

consolidation, pneumothorax, or pleural effusion.  No displaced

rib fractures identified. Leads overlie the chest. Upper

abdominal soft tissues are unremarkable.



IMPRESSION:



1. No acute pulmonary process identified.

## 2018-07-26 NOTE — EKG REPORT
SEVERITY:- ABNORMAL ECG -

SINUS RHYTHM

INCOMPLETE RIGHT BUNDLE BRANCH BLOCK

:

Confirmed by: Diya Deras MD 26-Jul-2018 23:50:45

## 2018-07-26 NOTE — ER DOCUMENT REPORT
ED Medical Screen (RME)





- General


Chief Complaint: Chest Pain


Stated Complaint: CHEST PAIN


Time Seen by Provider: 07/26/18 04:54


Mode of Arrival: Ambulatory


Information source: Patient


Notes: 





Patient is a 22-year-old male who presents with chief complaint of midsternal 

chest pain that started at approximately midnight.  Patient reports that he 

took pre-workouts, then went for a run at 11:30 PM.  Patient reports the pain 

started right after that.  Patient reports this pain feels like a sharp 

stabbing sensation.  Patient denies any nausea, vomiting or radiation of the 

pain.  Patient has been seen in this emergency department multiple times for 

the same reason all with negative workups.  Patient is denying any cardiac 

history.





Exam:


Tenderness to palpation to lower sternum.





I have greeted and performed a rapid initial assessment of this patient.  A 

comprehensive ED assessment and evaluation of the patient, analysis of test 

results and completion of the medical decision making process will be conducted 

by additional ED providers.





Dictation of this chart was performed using voice recognition software; 

therefore, there may be some unintended grammatical errors.


TRAVEL OUTSIDE OF THE [place] IN LAST 30 DAYS: No





- Related Data


Allergies/Adverse Reactions: 


 





lorazepam [From Ativan] Adverse Reaction (Verified 05/22/18 22:29)


 Anxiety











Past Medical History


Pulmonary Medical History: Reports: Hx Asthma


Renal/ Medical History: Denies: Hx Peritoneal Dialysis





- Immunizations


Immunizations up to date: Yes


Hx Diphtheria, Pertussis, Tetanus Vaccination: Yes





Physical Exam





- Vital signs


Vitals: 





 











Temp Pulse Resp BP Pulse Ox


 


 97.9 F   72   18   156/97 H  98 


 


 07/26/18 03:24  07/26/18 03:24  07/26/18 03:24  07/26/18 03:24  07/26/18 03:24














Course





- Vital Signs


Vital signs: 





 











Temp Pulse Resp BP Pulse Ox


 


 97.9 F   72   13   127/77 H  98 


 


 07/26/18 03:24  07/26/18 03:24  07/26/18 04:04  07/26/18 04:04  07/26/18 03:24














Doctor's Discharge





- Discharge


Referrals: 


ROBERT LEAHY MD [Primary Care Provider] - Follow up as needed

## 2018-07-26 NOTE — ER DOCUMENT REPORT
ED General





- General


Chief Complaint: Chest Pain


Stated Complaint: CHEST PAIN


Time Seen by Provider: 07/26/18 04:54


Mode of Arrival: Ambulatory


Notes: 


22-year-old male to emergency department chief complaint of chest pain, anxiety

, hypothyroidism.  Patient states that he has a history of hyperthyroidism 

which is caused anxiety and chest pain in the past.  Is not on any therapy at 

this time because endocrinologist said that everything had normalized.  Takes 

his atenolol.  Had some flushing of the face in some tightness in the chest.  

Gets really anxious when this happens so came to the emergency department.  

Currently his chest pain is resolved.  Denies any shortness of breath, fever, 

chills, sweats or other issues at this time.  Denies any recent weight loss.  

Denies any tachycardia recently.





TRAVEL OUTSIDE OF THE [place] IN LAST 30 DAYS: No





- HPI


Onset: Just prior to arrival


Onset/Duration: Gradual





- Related Data


Allergies/Adverse Reactions: 


 





lorazepam [From Ativan] Adverse Reaction (Verified 05/22/18 22:29)


 Anxiety











Past Medical History





- General


Information source: Patient





- Social History


Smoking Status: Never Smoker


Cigarette use (# per day): No


Frequency of alcohol use: None


Drug Abuse: None


Lives with: Family


Family History: Reviewed & Not Pertinent


Patient has suicidal ideation: No


Patient has homicidal ideation: No


Pulmonary Medical History: Reports: Hx Asthma


Endocrine Medical History: Reports: Hx Hyperthyroidism


Renal/ Medical History: Denies: Hx Peritoneal Dialysis





- Immunizations


Immunizations up to date: Yes


Hx Diphtheria, Pertussis, Tetanus Vaccination: Yes





Review of Systems





- Review of Systems


Constitutional: denies: Chills, Diaphoresis, Fever, Malaise, Weakness, Weight 

loss


EENT: denies: Eye pain, Difficulty swallowing, Throat swelling, Mouth pain


Cardiovascular: Chest pain, Palpitations.  denies: Heart racing, Orthopnea, 

Dyspnea


Respiratory: denies: Cough, Hurts to breathe, Hemoptysis, Short of breath, 

Wheezing


Gastrointestinal: denies: Abdominal pain, Diarrhea, Nausea, Vomiting


Genitourinary: denies: Burning, Dysuria, Discharge


Musculoskeletal: denies: Back pain, Joint pain, Joint swelling, Muscle pain


Skin: denies: Change in color, Dryness, Lesions, Lumps, Rash


Neurological/Psychological: Anxiety.  denies: Confusion, Weakness, Numbness





Physical Exam





- Vital signs


Vitals: 


 











Temp Pulse Resp BP Pulse Ox


 


 97.9 F   72   18   156/97 H  98 


 


 07/26/18 03:24  07/26/18 03:24  07/26/18 03:24  07/26/18 03:24  07/26/18 03:24











Interpretation: Normal





- General


General appearance: Appears well, Alert





- HEENT


Head: Normocephalic, Atraumatic


Eyes: Normal


Pupils: PERRL





- Respiratory


Respiratory status: No respiratory distress


Chest status: Nontender


Breath sounds: Normal


Chest palpation: Normal





- Cardiovascular


Rhythm: Regular


Heart sounds: Normal auscultation


Murmur: No





- Abdominal


Inspection: Normal


Distension: No distension


Bowel sounds: Normal


Tenderness: Nontender


Organomegaly: No organomegaly





- Back


Back: Normal, Nontender





- Extremities


General upper extremity: Normal inspection, Nontender, Normal color, Normal ROM

, Normal temperature


General lower extremity: Normal inspection, Nontender, Normal color, Normal ROM

, Normal temperature, Normal weight bearing.  No: Juvencio's sign





- Neurological


Neuro grossly intact: Yes


Cognition: Normal


Orientation: AAOx4


Red Coma Scale Eye Opening: Spontaneous


Red Coma Scale Verbal: Oriented


Kennan Coma Scale Motor: Obeys Commands


Red Coma Scale Total: 15


Speech: Normal


Motor strength normal: LUE, RUE, LLE, RLE


Sensory: Normal





- Psychological


Associated symptoms: Normal affect, Normal mood





- Skin


Skin Temperature: Warm


Skin Moisture: Dry


Skin Color: Normal





Course





- Re-evaluation


Re-evalutation: 





07/26/18 06:19


At this time patient is resting comfortably.  No acute distress.  Reports 

history of hyperthyroidism.  Has had his labs performed in the last month and 

states that they are at normal.  Has an appointment with endocrinologist in 4 

weeks.  Offered blood work today but patient states that he actually is feeling 

a little bit better at this time and does not want to stay but wants to go 

home.  States he will return if his symptoms get worse.  Gave him warning signs 

with regards to worsening hypothyroidism and thyroid crisis including 

tachycardia, hypertension, weight loss, anxiety, chest pain or other symptoms.  

Patient verbalized understanding these instructions.  At this time I do not 

feel patient is having an acute coronary syndrome.  This is a well-appearing 

male in no acute distress at this time.  Has appropriate follow-up.  Has 

appropriate medications.  Heart rate currently 58.  Blood pressure normal.  

Afebrile and in no acute distress.  Once again I did offer blood work at this 

time but patient did not want blood work performed and would like to go home 

because he states he is feeling better.





- Vital Signs


Vital signs: 


 











Temp Pulse Resp BP Pulse Ox


 


 98.5 F   72   14   117/83   100 


 


 07/26/18 06:01  07/26/18 03:24  07/26/18 06:01  07/26/18 06:01  07/26/18 06:01














- EKG Interpretation by Me


EKG shows normal: Axis, Intervals, QRS Complexes, ST-T Waves


Axis/QRS: RBBB





Discharge





- Discharge


Clinical Impression: 


Chest pain


Qualifiers:


 Chest pain type: unspecified Qualified Code(s): R07.9 - Chest pain, unspecified





Condition: Good


Disposition: HOME, SELF-CARE


Instructions:  Chest Pain of Unclear Cause ([facility])


Additional Instructions: 


In the event that you develop worsening symptoms, chest pain, weight loss, hair 

loss, severe anxiety, insomnia or other symptoms consistent with a return of 

your hyperthyroidism then please follow-up with her endocrinologist or return 

to the emergency department for repeat evaluation.  Laboratory studies were 

offered to you today but you have decided to postpone that workup.  We are 

always here.  Please know that if symptoms are worse we do want to see you and 

want to reevaluate you. follow-up with your doctors as soon as possible.


Forms:  Return to Work


Referrals: 


ROBERT LEAHY MD [Primary Care Provider] - Follow up as needed

## 2018-12-22 ENCOUNTER — HOSPITAL ENCOUNTER (EMERGENCY)
Dept: HOSPITAL 62 - ER | Age: 23
Discharge: HOME | End: 2018-12-22
Payer: COMMERCIAL

## 2018-12-22 VITALS — SYSTOLIC BLOOD PRESSURE: 143 MMHG | DIASTOLIC BLOOD PRESSURE: 67 MMHG

## 2018-12-22 DIAGNOSIS — S16.1XXA: Primary | ICD-10-CM
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
## 2018-12-22 DIAGNOSIS — R51: ICD-10-CM

## 2018-12-22 DIAGNOSIS — J45.909: ICD-10-CM

## 2018-12-22 DIAGNOSIS — M54.2: ICD-10-CM

## 2018-12-22 DIAGNOSIS — S00.01XA: ICD-10-CM

## 2018-12-22 PROCEDURE — 72125 CT NECK SPINE W/O DYE: CPT

## 2018-12-22 PROCEDURE — 99284 EMERGENCY DEPT VISIT MOD MDM: CPT
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
## 2019-06-07 ENCOUNTER — HOSPITAL ENCOUNTER (EMERGENCY)
Dept: HOSPITAL 62 - ER | Age: 24
Discharge: HOME | End: 2019-06-07
Payer: COMMERCIAL

## 2019-06-07 VITALS — DIASTOLIC BLOOD PRESSURE: 77 MMHG | SYSTOLIC BLOOD PRESSURE: 156 MMHG

## 2019-06-07 DIAGNOSIS — J02.8: Primary | ICD-10-CM

## 2019-06-07 DIAGNOSIS — J45.909: ICD-10-CM

## 2019-06-07 DIAGNOSIS — R05: ICD-10-CM

## 2019-06-07 DIAGNOSIS — R11.0: ICD-10-CM

## 2019-06-07 DIAGNOSIS — R06.02: ICD-10-CM

## 2019-06-07 DIAGNOSIS — R50.9: ICD-10-CM

## 2019-06-07 DIAGNOSIS — R00.0: ICD-10-CM

## 2019-06-07 DIAGNOSIS — B97.89: ICD-10-CM

## 2019-06-07 PROCEDURE — 96372 THER/PROPH/DIAG INJ SC/IM: CPT

## 2019-06-07 PROCEDURE — 99283 EMERGENCY DEPT VISIT LOW MDM: CPT

## 2019-06-07 PROCEDURE — 87880 STREP A ASSAY W/OPTIC: CPT

## 2019-06-07 PROCEDURE — 87070 CULTURE OTHR SPECIMN AEROBIC: CPT

## 2019-09-29 ENCOUNTER — HOSPITAL ENCOUNTER (EMERGENCY)
Dept: HOSPITAL 62 - ER | Age: 24
Discharge: LEFT BEFORE DISCHARGE | End: 2019-09-29
Payer: SELF-PAY

## 2019-09-29 VITALS — DIASTOLIC BLOOD PRESSURE: 81 MMHG | SYSTOLIC BLOOD PRESSURE: 126 MMHG

## 2019-09-29 DIAGNOSIS — Z53.21: Primary | ICD-10-CM

## 2019-09-29 DIAGNOSIS — R07.9: ICD-10-CM

## 2019-09-29 DIAGNOSIS — I45.10: ICD-10-CM

## 2019-09-30 NOTE — EKG REPORT
SEVERITY:- ABNORMAL ECG -

SINUS TACHYCARDIA

INCOMPLETE RIGHT BUNDLE BRANCH BLOCK

:

Confirmed by: Prabhakar Foley 30-Sep-2019 00:45:00

## 2020-01-12 ENCOUNTER — HOSPITAL ENCOUNTER (EMERGENCY)
Dept: HOSPITAL 62 - ER | Age: 25
Discharge: HOME | End: 2020-01-12
Payer: COMMERCIAL
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
## 2020-01-12 DIAGNOSIS — M54.5: Primary | ICD-10-CM

## 2020-01-12 PROCEDURE — 99283 EMERGENCY DEPT VISIT LOW MDM: CPT
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
## 2020-02-12 ENCOUNTER — HOSPITAL ENCOUNTER (EMERGENCY)
Dept: HOSPITAL 62 - ER | Age: 25
Discharge: HOME | End: 2020-02-12
Payer: COMMERCIAL

## 2020-02-12 VITALS — DIASTOLIC BLOOD PRESSURE: 69 MMHG | SYSTOLIC BLOOD PRESSURE: 139 MMHG

## 2020-02-12 DIAGNOSIS — Z02.89: Primary | ICD-10-CM

## 2020-02-12 NOTE — ER DOCUMENT REPORT
HPI





- HPI


Patient complains to provider of: work note


Time Seen by Provider: 02/12/20 21:41


Onset: Other


Quality of pain: No pain


Context: 





24-year-old male presents emergency department with request for a work note.  He

was here last month because he hurt his back unloading a truck at [facility].  He 

reports he has been on leave of absence for the past month.  He reports he needs

a work note to go back to work.  He denies fever vomiting diarrhea.  Reports he 

has a slight cough.  Denies back pain at this time.  No complaints of urinary 

bowel incontinence or retention.  Patient is happy laughing no distress


Associated Symptoms: None


Exacerbated by: Denies


Relieved by: Denies


Similar symptoms previously: No


Recently seen / treated by doctor: No





- REPRODUCTIVE


Reproductive: DENIES: Pregnant:





Past Medical History





- General


Information source: Patient





- Social History


Smoking Status: Unknown if Ever Smoked


Family History: Reviewed & Not Pertinent


Patient has suicidal ideation: No


Patient has homicidal ideation: No


Pulmonary Medical History: Reports: Hx Asthma


Endocrine Medical History: Reports: Hx Hyperthyroidism


Renal/ Medical History: Denies: Hx Peritoneal Dialysis


Surgical Hx: Negative





- Immunizations


Immunizations up to date: Yes


Hx Diphtheria, Pertussis, Tetanus Vaccination: Yes





Vertical Provider Document





- CONSTITUTIONAL


Agree With Documented VS: Yes


Exam Limitations: No Limitations


General Appearance: WD/WN, No Apparent Distress





- INFECTION CONTROL


TRAVEL OUTSIDE OF THE [place] IN LAST 30 DAYS: No





- HEENT


HEENT: Atraumatic, Normocephalic





- NECK


Neck: Supple





- RESPIRATORY


Respiratory: No Respiratory Distress





- CARDIOVASCULAR


Cardiovascular: Regular Rate





- MUSCULOSKELETAL/EXTREMETIES


Musculoskeletal/Extremeties: MAEW, FROM





- NEURO


Level of Consciousness: Awake, Alert, Appropriate


Motor/Sensory: No Motor Deficit





Course





- Re-evaluation


Re-evalutation: 





02/12/20 21:50


Patient reports that he is just here for work note to go back to work.  He 

denies back pain.  Denies all symptoms.  Patient looks happy no distress.  

Ambulates without problems.  No complaints of urinary bowel retention or 

incontinence.  I asked patient if he had gone to his human resources to follow-

up with a company physician but he reports they never offered this to him.  He 

reports he was just placed on [facility].





- Vital Signs


Vital signs: 


                                        











Temp Pulse Resp BP Pulse Ox


 


 98.7 F   89   16   139/69 H  100 


 


 02/12/20 21:22  02/12/20 21:22  02/12/20 21:22  02/12/20 21:22  02/12/20 21:22














Discharge





- Discharge


Clinical Impression: 


 work note





Condition: Stable


Disposition: HOME, SELF-CARE


Additional Instructions: 


*You have been to the emergency department tonight for a work note


You were seen 1 month ago for back pain.


You should follow-up with your primary care provider within 1 week for recheck


Return to the emergency department for return of back pain concerns.


Forms:  Return to Work


Referrals: 


JADYN SHEPHERD PA [Primary Care Provider] - Follow up in 3-5 days

## 2020-10-12 ENCOUNTER — HOSPITAL ENCOUNTER (EMERGENCY)
Dept: HOSPITAL 62 - ER | Age: 25
Discharge: HOME | End: 2020-10-12
Payer: SELF-PAY

## 2020-10-12 VITALS — SYSTOLIC BLOOD PRESSURE: 133 MMHG | DIASTOLIC BLOOD PRESSURE: 65 MMHG

## 2020-10-12 DIAGNOSIS — Z20.2: ICD-10-CM

## 2020-10-12 DIAGNOSIS — N50.3: Primary | ICD-10-CM

## 2020-10-12 DIAGNOSIS — R30.0: ICD-10-CM

## 2020-10-12 DIAGNOSIS — N50.811: ICD-10-CM

## 2020-10-12 LAB
ADD MANUAL DIFF: NO
ALBUMIN SERPL-MCNC: 4.7 G/DL (ref 3.5–5)
ALP SERPL-CCNC: 72 U/L (ref 38–126)
ANION GAP SERPL CALC-SCNC: 9 MMOL/L (ref 5–19)
APPEARANCE UR: CLEAR
APTT PPP: YELLOW S
AST SERPL-CCNC: 65 U/L (ref 17–59)
BASOPHILS # BLD AUTO: 0 10^3/UL (ref 0–0.2)
BASOPHILS NFR BLD AUTO: 0.7 % (ref 0–2)
BILIRUB DIRECT SERPL-MCNC: 0.2 MG/DL (ref 0–0.4)
BILIRUB SERPL-MCNC: 0.5 MG/DL (ref 0.2–1.3)
BILIRUB UR QL STRIP: NEGATIVE
BUN SERPL-MCNC: 15 MG/DL (ref 7–20)
CALCIUM: 9.3 MG/DL (ref 8.4–10.2)
CHLAM PCR: NOT DETECTED
CHLORIDE SERPL-SCNC: 106 MMOL/L (ref 98–107)
CO2 SERPL-SCNC: 27 MMOL/L (ref 22–30)
EOSINOPHIL # BLD AUTO: 0.1 10^3/UL (ref 0–0.6)
EOSINOPHIL NFR BLD AUTO: 1.4 % (ref 0–6)
ERYTHROCYTE [DISTWIDTH] IN BLOOD BY AUTOMATED COUNT: 13.3 % (ref 11.5–14)
GLUCOSE SERPL-MCNC: 94 MG/DL (ref 75–110)
GLUCOSE UR STRIP-MCNC: NEGATIVE MG/DL
HCT VFR BLD CALC: 43.6 % (ref 37.9–51)
HGB BLD-MCNC: 15 G/DL (ref 13.5–17)
KETONES UR STRIP-MCNC: NEGATIVE MG/DL
LYMPHOCYTES # BLD AUTO: 1.7 10^3/UL (ref 0.5–4.7)
LYMPHOCYTES NFR BLD AUTO: 32.7 % (ref 13–45)
MCH RBC QN AUTO: 31.5 PG (ref 27–33.4)
MCHC RBC AUTO-ENTMCNC: 34.4 G/DL (ref 32–36)
MCV RBC AUTO: 92 FL (ref 80–97)
MONOCYTES # BLD AUTO: 0.6 10^3/UL (ref 0.1–1.4)
MONOCYTES NFR BLD AUTO: 11.6 % (ref 3–13)
NEUTROPHILS # BLD AUTO: 2.7 10^3/UL (ref 1.7–8.2)
NEUTS SEG NFR BLD AUTO: 53.6 % (ref 42–78)
NITRITE UR QL STRIP: NEGATIVE
PH UR STRIP: 7 [PH] (ref 5–9)
PLATELET # BLD: 297 10^3/UL (ref 150–450)
POTASSIUM SERPL-SCNC: 4.8 MMOL/L (ref 3.6–5)
PROT SERPL-MCNC: 7.8 G/DL (ref 6.3–8.2)
PROT UR STRIP-MCNC: NEGATIVE MG/DL
RBC # BLD AUTO: 4.75 10^6/UL (ref 4.35–5.55)
SP GR UR STRIP: 1.02
TOTAL CELLS COUNTED % (AUTO): 100 %
UROBILINOGEN UR-MCNC: NEGATIVE MG/DL (ref ?–2)
WBC # BLD AUTO: 5.1 10^3/UL (ref 4–10.5)

## 2020-10-12 PROCEDURE — 99284 EMERGENCY DEPT VISIT MOD MDM: CPT

## 2020-10-12 PROCEDURE — 93976 VASCULAR STUDY: CPT

## 2020-10-12 PROCEDURE — 87491 CHLMYD TRACH DNA AMP PROBE: CPT

## 2020-10-12 PROCEDURE — 36415 COLL VENOUS BLD VENIPUNCTURE: CPT

## 2020-10-12 PROCEDURE — 85025 COMPLETE CBC W/AUTO DIFF WBC: CPT

## 2020-10-12 PROCEDURE — 80053 COMPREHEN METABOLIC PANEL: CPT

## 2020-10-12 PROCEDURE — 81001 URINALYSIS AUTO W/SCOPE: CPT

## 2020-10-12 PROCEDURE — 87591 N.GONORRHOEAE DNA AMP PROB: CPT

## 2020-10-12 PROCEDURE — 76870 US EXAM SCROTUM: CPT

## 2020-10-12 NOTE — ER DOCUMENT REPORT
ED GI/





- General


Chief Complaint: Testicular Pain


Stated Complaint: STD CHECK


Time Seen by Provider: 10/12/20 10:32


Primary Care Provider: 


FLORENCIA GUERRERO MD [NO LOCAL MD] - Follow up in 1 week (for urology follow up)


JADYN SHEPHERD PA [Primary Care Provider] - Follow up in 1 week


Mode of Arrival: Ambulatory


TRAVEL OUTSIDE OF THE [place] IN LAST 30 DAYS: No





- HPI


Notes: 





10/12/20 13:34


25-year-old male to the emergency department with complaints of testicular pain 

that is been going on for about 2 months.  He states that his girlfriend had 

chlamydia back in February but he did not get treated until the beginning of 

October.  He states that he was given a shot and 4 pills for coverage for 

chlamydia.  He states that despite this he has continued to have some discomfort

with urination as well as testicular pain.  He states that the testicular pain 

feels tight like the testicles are swollen.  It comes and goes.  He denies any 

fevers or chills.  Sometimes he has hesitancy when he urinates and a little bit 

of burning.  He denies any fevers or chills.  Admits to slight suprapubic 

tenderness.  Denies any penile discharge. 





- Related Data


Allergies/Adverse Reactions: 


                                        





lorazepam [From Ativan] Adverse Reaction (Verified 10/12/20 10:32)


   Anxiety











Past Medical History





- General


Information source: Patient





- Social History


Smoking Status: Never Smoker


Chew tobacco use (# tins/day): No


Frequency of alcohol use: None


Drug Abuse: None


Family History: Reviewed & Not Pertinent


Patient has homicidal ideation: No


Pulmonary Medical History: Reports: Hx Asthma


Endocrine Medical History: Reports: Hx Hyperthyroidism


Renal/ Medical History: Denies: Hx Peritoneal Dialysis





- Immunizations


Immunizations up to date: Yes


Hx Diphtheria, Pertussis, Tetanus Vaccination: Yes





Review of Systems





- Review of Systems


Constitutional: denies: Chills, Fever


EENT: No symptoms reported


Cardiovascular: denies: Chest pain, Palpitations, Heart racing, Dizziness, 

Lightheaded


Respiratory: denies: Cough, Short of breath


Gastrointestinal: denies: Abdominal pain, Diarrhea, Nausea, Vomiting


Genitourinary: See HPI, Dysuria


Male Genitourinary: See HPI, Testicular pain


Musculoskeletal: No symptoms reported


Skin: No symptoms reported


Hematologic/Lymphatic: No symptoms reported


Neurological/Psychological: No symptoms reported


-: Yes All other systems reviewed and negative





Physical Exam





- Vital signs


Vitals: 


                                        











Temp Pulse Resp BP Pulse Ox


 


 98.9 F   61   16   129/76 H  100 


 


 10/12/20 10:20  10/12/20 10:20  10/12/20 10:20  10/12/20 10:20  10/12/20 10:20











Interpretation: Normal





- General


General appearance: Appears well, Alert


In distress: None





- HEENT


Head: Normocephalic, Atraumatic


Eyes: Normal


Pupils: PERRL





- Respiratory


Respiratory status: No respiratory distress


Chest status: Nontender


Breath sounds: Normal.  No: Rales, Rhonchi, Wheezing


Chest palpation: Normal





- Cardiovascular


Rhythm: Regular


Heart sounds: Normal auscultation


Murmur: No





- Abdominal


Inspection: Normal


Distension: No distension


Bowel sounds: Normal


Tenderness: Nontender.  No: Tender, McBurney's point, Gallagher's sign, Guarding, 

Rebound


Organomegaly: No organomegaly





- Genitourinary


Inspection: Normal


Tenderness: Testicle tender - Mild tenderness to palpation over bilateral 

testicles without edema.  There is no ecchymosis, erythema, or abscess.  

Chaperoned by MORENITA Acevedo





- Back


Back: Normal, Nontender.  No: CVA tenderness





- Neurological


Neuro grossly intact: Yes


Cognition: Normal


Orientation: AAOx4


[place] Coma Scale Eye Opening: Spontaneous


Red Coma Scale Verbal: Oriented


Red Coma Scale Motor: Obeys Commands


Red Coma Scale Total: 15


Speech: Normal


Cranial nerves: Normal


Cerebellar coordination: Normal


Motor strength normal: LUE, RUE, LLE, RLE


Additional motor exam normals: Equal 


Sensory: Normal





- Psychological


Associated symptoms: Normal affect, Normal mood





- Skin


Skin Temperature: Warm


Skin Moisture: Dry


Skin Color: Normal





Course





- Re-evaluation


Re-evalutation: 





10/12/20 


Impression: Testicular pain.  This is been going on for some time.  Patient 

voices concern for possible infection.  His urine gonorrhea and chlamydia was 

negative here in the department.  However we will go ahead and cover with 

doxycycline for 2 weeks.  Also encouraged him to follow-up with urology.  Noted 

scrotal ultrasound shows that the differential cyst.  Patient is to return if 

any worsening symptoms.  Encourage no sexual intercourse until evaluated by 

urologist.  Patient agrees with the plan.








- Vital Signs


Vital signs: 


                                        











Temp Pulse Resp BP Pulse Ox


 


 98.8 F   66   16   133/65 H  100 


 


 10/12/20 14:57  10/12/20 14:57  10/12/20 14:57  10/12/20 14:57  10/12/20 14:57














- Laboratory


Result Diagrams: 


                                 10/12/20 10:44





                                 10/12/20 10:44


Laboratory results interpreted by me: 


                                        











  10/12/20 10/12/20





  10:44 10:44


 


AST  65 H 


 


ALT  54 H 


 


Urine Ascorbic Acid   20 H














- Diagnostic Test


Radiology reviewed: Image reviewed, Reports reviewed





Discharge





- Discharge


Clinical Impression: 


 Epididymal cyst





Testicular pain


Qualifiers:


 Laterality: bilateral Qualified Code(s): N50.811 - Right testicular pain





Condition: Stable


Disposition: HOME, SELF-CARE


Instructions:  Testicular Pain ([facility])


Additional Instructions: 


Follow-up with urologist without fail.  Take antibiotics as prescribed.  No sex 

until you see the urologist.  Return if any worsening symptoms.


Prescriptions: 


Doxycycline Monohydrate [Monodox] 100 mg PO BID #28 capsule


Naproxen [Naprosyn 375 Mg Tablet] 375 mg PO BID #20 tablet


Referrals: 


JADYN SHEPHERD PA [Primary Care Provider] - Follow up in 1 week


FLORENCIA GUERRERO MD [NO LOCAL MD] - Follow up in 1 week (for urology follow up)

## 2020-10-12 NOTE — ER DOCUMENT REPORT
ED Medical Screen (RME)





- General


Chief Complaint: STD Exposure


Stated Complaint: STD CHECK


Time Seen by Provider: 10/12/20 10:32


Primary Care Provider: 


JADYN SHEPHERD PA [Primary Care Provider] - Follow up as needed


Mode of Arrival: Ambulatory


Information source: Patient


Notes: 





Otherwise healthy 25-year-old male presents the emergency department chief 

complaint of lower abdominal pain, low back pain, dysuria and scrotal 

tenderness.  Patient reports he was recently treated for chlamydia.  He states 

that his girlfriend has had chlamydia since February, she got treated at that 

time but due to COVID-19 he never got treated until 2 weeks ago.  He is 

concerned that he may have a worsening infection.  He denies any fever chills 

but states he has been getting cold sweats frequently.





He is alert, oriented, no acute distress noted.  Exam deferred until patient is 

in a room.





I have greeted and performed a rapid initial assessment of this patient.  A 

comprehensive ED assessment and evaluation of the patient, analysis of test 

results and completion of the medical decision making process will be conducted 

by additional ED providers.  I have specifically instructed the patient or 

family members with the patient to immediately return to any nursing staff 

should anything change in the patient's condition or with their chief complaint.





TRAVEL OUTSIDE OF THE [place] IN LAST 30 DAYS: No





- Related Data


Allergies/Adverse Reactions: 


                                        





lorazepam [From Ativan] Adverse Reaction (Verified 10/12/20 10:32)


   Anxiety











Past Medical History


Pulmonary Medical History: Reports: Hx Asthma


Endocrine Medical History: Reports: Hx Hyperthyroidism


Renal/ Medical History: Denies: Hx Peritoneal Dialysis





- Immunizations


Immunizations up to date: Yes


Hx Diphtheria, Pertussis, Tetanus Vaccination: Yes





Physical Exam





- Vital signs


Vitals: 





                                        











Temp Pulse Resp BP Pulse Ox


 


 98.9 F   61   16   129/76 H  100 


 


 10/12/20 10:20  10/12/20 10:20  10/12/20 10:20  10/12/20 10:20  10/12/20 10:20














Course





- Vital Signs


Vital signs: 





                                        











Temp Pulse Resp BP Pulse Ox


 


 98.9 F   61   16   129/76 H  100 


 


 10/12/20 10:20  10/12/20 10:20  10/12/20 10:20  10/12/20 10:20  10/12/20 10:20














Doctor's Discharge





- Discharge


Referrals: 


SHEPHERD,JADYN, PA [Primary Care Provider] - Follow up as needed

## 2020-10-12 NOTE — RADIOLOGY REPORT (SQ)
EXAM DESCRIPTION:  U/S SCROTUM W/DOPPLER



IMAGES COMPLETED DATE/TIME:  10/12/2020 2:01 pm



REASON FOR STUDY:  testicular pain



COMPARISON:  None.



TECHNIQUE:  Static and realtime gray scale imaging of the scrotum and testes.  Selected color Doppler
 and spectral images recorded to document blood flow.



LIMITATIONS:  None.



FINDINGS:  RIGHT:

TESTICLE: Normal size. Normal echotexture.  Normal blood flow.  No mass.

EPIDIDYMIS: Normal.

HYDROCELE OR VARICOCELE: No.

HERNIA OR EXTRA-TESTICULAR MASS: No.

OTHER: No other significant finding.

LEFT:

TESTICLE: Normal size. Normal echotexture.  Normal blood flow.  No mass.

EPIDIDYMIS: Normal except for the presence of a 3 mm cyst in the epididymal head.

HYDROCELE OR VARICOCELE: No.

HERNIA OR EXTRA-TESTICULAR MASS: No.

OTHER: No other significant finding.



IMPRESSION:  Small left epididymal cyst.  No other significant findings in the scrotum.  There is nor
mal blood flow to each testicle.



TECHNICAL DOCUMENTATION:  JOB ID:  [id number]

 2011 [employer]- All Rights Reserved



Reading location - IP/workstation name: [email or web address]